# Patient Record
Sex: FEMALE | Race: WHITE | NOT HISPANIC OR LATINO | URBAN - METROPOLITAN AREA
[De-identification: names, ages, dates, MRNs, and addresses within clinical notes are randomized per-mention and may not be internally consistent; named-entity substitution may affect disease eponyms.]

---

## 2018-06-01 ENCOUNTER — OUTPATIENT (OUTPATIENT)
Dept: OUTPATIENT SERVICES | Facility: HOSPITAL | Age: 63
LOS: 1 days | End: 2018-06-01
Payer: MEDICAID

## 2018-06-01 PROCEDURE — G9001: CPT

## 2018-06-22 DIAGNOSIS — R69 ILLNESS, UNSPECIFIED: ICD-10-CM

## 2018-07-01 ENCOUNTER — OUTPATIENT (OUTPATIENT)
Dept: OUTPATIENT SERVICES | Facility: HOSPITAL | Age: 63
LOS: 1 days | End: 2018-07-01

## 2018-07-16 DIAGNOSIS — Z71.89 OTHER SPECIFIED COUNSELING: ICD-10-CM

## 2021-01-13 ENCOUNTER — RESULT REVIEW (OUTPATIENT)
Age: 66
End: 2021-01-13

## 2021-01-13 ENCOUNTER — APPOINTMENT (OUTPATIENT)
Dept: ULTRASOUND IMAGING | Facility: CLINIC | Age: 66
End: 2021-01-13
Payer: MEDICARE

## 2021-01-13 PROCEDURE — 93971 EXTREMITY STUDY: CPT | Mod: RT

## 2021-05-28 PROBLEM — Z00.00 ENCOUNTER FOR PREVENTIVE HEALTH EXAMINATION: Status: ACTIVE | Noted: 2021-05-28

## 2021-06-21 ENCOUNTER — APPOINTMENT (OUTPATIENT)
Dept: RADIOLOGY | Facility: CLINIC | Age: 66
End: 2021-06-21
Payer: MEDICARE

## 2021-06-21 PROCEDURE — 72110 X-RAY EXAM L-2 SPINE 4/>VWS: CPT

## 2022-03-01 ENCOUNTER — EMERGENCY (EMERGENCY)
Facility: HOSPITAL | Age: 67
LOS: 1 days | Discharge: HOME | End: 2022-03-01
Admitting: EMERGENCY MEDICINE
Payer: MEDICARE

## 2022-03-01 PROCEDURE — 93970 EXTREMITY STUDY: CPT | Mod: 26

## 2022-03-01 PROCEDURE — 99284 EMERGENCY DEPT VISIT MOD MDM: CPT

## 2022-03-02 ENCOUNTER — APPOINTMENT (OUTPATIENT)
Dept: CARDIOLOGY | Facility: CLINIC | Age: 67
End: 2022-03-02
Payer: MEDICARE

## 2022-03-02 ENCOUNTER — NON-APPOINTMENT (OUTPATIENT)
Age: 67
End: 2022-03-02

## 2022-03-02 VITALS — SYSTOLIC BLOOD PRESSURE: 126 MMHG | DIASTOLIC BLOOD PRESSURE: 76 MMHG

## 2022-03-02 VITALS
DIASTOLIC BLOOD PRESSURE: 76 MMHG | SYSTOLIC BLOOD PRESSURE: 120 MMHG | OXYGEN SATURATION: 97 % | BODY MASS INDEX: 28.71 KG/M2 | HEART RATE: 80 BPM | WEIGHT: 156 LBS | HEIGHT: 62 IN

## 2022-03-02 DIAGNOSIS — Z82.3 FAMILY HISTORY OF STROKE: ICD-10-CM

## 2022-03-02 DIAGNOSIS — Z82.79 FAMILY HISTORY OF OTHER CONGENITAL MALFORMATIONS, DEFORMATIONS AND CHROMOSOMAL ABNORMALITIES: ICD-10-CM

## 2022-03-02 DIAGNOSIS — Z78.9 OTHER SPECIFIED HEALTH STATUS: ICD-10-CM

## 2022-03-02 DIAGNOSIS — Z82.49 FAMILY HISTORY OF ISCHEMIC HEART DISEASE AND OTHER DISEASES OF THE CIRCULATORY SYSTEM: ICD-10-CM

## 2022-03-02 DIAGNOSIS — B20 HUMAN IMMUNODEFICIENCY VIRUS [HIV] DISEASE: ICD-10-CM

## 2022-03-02 DIAGNOSIS — Z87.891 PERSONAL HISTORY OF NICOTINE DEPENDENCE: ICD-10-CM

## 2022-03-02 DIAGNOSIS — F41.1 GENERALIZED ANXIETY DISORDER: ICD-10-CM

## 2022-03-02 PROCEDURE — 99204 OFFICE O/P NEW MOD 45 MIN: CPT | Mod: 25

## 2022-03-02 PROCEDURE — 93000 ELECTROCARDIOGRAM COMPLETE: CPT

## 2022-03-02 RX ORDER — AMLODIPINE BESYLATE 5 MG/1
5 TABLET ORAL DAILY
Refills: 0 | Status: DISCONTINUED | COMMUNITY
End: 2022-03-02

## 2022-03-02 RX ORDER — BUPROPION HYDROCHLORIDE 200 MG/1
200 TABLET, FILM COATED, EXTENDED RELEASE ORAL TWICE DAILY
Refills: 0 | Status: ACTIVE | COMMUNITY

## 2022-03-02 RX ORDER — FUROSEMIDE 20 MG/1
20 TABLET ORAL DAILY
Refills: 0 | Status: DISCONTINUED | COMMUNITY
End: 2022-03-02

## 2022-03-02 NOTE — HISTORY OF PRESENT ILLNESS
[FreeTextEntry1] : 66 year old female with history of HIV (undetectable viral load), HTN, HLD presents for a cardiac evaluation because of swelling in her legs that past two days. In the past, patient would have minimal swelling from time to time from salty foods. This was managed by Lasix 20mg PRN. The past two days her legs have become very swollen and the skin feels tight. The swelling is better when she wakes up in the morning and throughout the day the swelling worsens. Of note she was recently started on amlodipine 5mg daily (end of 2021).She went to [facility] ED, 3/1/2022, for evaluation. She underwent venous duplex, which revealed no evidence of DVT bilaterally.\par \par Patient has no chest pain, SOB, or palpitations. No orthopnea. \par \par There is no history of MI, CVA, CHF, or previous coronary intervention.\par

## 2022-03-02 NOTE — CARDIOLOGY SUMMARY
[de-identified] : 03/2/2022, NSR, normal ECG [de-identified] : 3/1/2022, Venous Duplex: no DVT bilaterally

## 2022-03-02 NOTE — PHYSICAL EXAM
[Normal] : normal gait [de-identified] : No carotid bruits auscultated bilaterally [de-identified] : pitting edema bilateral lower extremities extending half way up the shin and involving her feet as well.

## 2022-03-02 NOTE — DISCUSSION/SUMMARY
[FreeTextEntry1] : 1. Bilateral Lower Extremity Edema: appears to be dependent edema. No associated dyspnea or orthopnea. Venous Duplex, 3/1/2022, revealed no evidence of DVT. Recommend d/c amlodipine. Add HCTZ 25mg daily to antihypertensive medications, instead of Lasix PRN. Elevation and low salt diet recommended. Echocardiogram ordered. \par \par 2. HTN: controlled but amlodipine possibly contributing to lower extremity edema. Recommend starting HCTZ 25mg daily instead. Recommend continuing losartan 50mg daily. Goal BP less than 130/80. Low salt diet. \par \par 3. HLD: continue pravastatin 40mg daily.\par \par Follow up in 3 weeks for BP check and review echo results.

## 2022-03-03 DIAGNOSIS — R20.2 PARESTHESIA OF SKIN: ICD-10-CM

## 2022-03-03 DIAGNOSIS — M79.89 OTHER SPECIFIED SOFT TISSUE DISORDERS: ICD-10-CM

## 2022-03-03 DIAGNOSIS — I10 ESSENTIAL (PRIMARY) HYPERTENSION: ICD-10-CM

## 2022-03-03 DIAGNOSIS — Z21 ASYMPTOMATIC HUMAN IMMUNODEFICIENCY VIRUS [HIV] INFECTION STATUS: ICD-10-CM

## 2022-03-21 ENCOUNTER — APPOINTMENT (OUTPATIENT)
Dept: CARDIOLOGY | Facility: CLINIC | Age: 67
End: 2022-03-21
Payer: MEDICARE

## 2022-03-21 PROCEDURE — 93306 TTE W/DOPPLER COMPLETE: CPT

## 2022-03-26 ENCOUNTER — APPOINTMENT (OUTPATIENT)
Dept: CARDIOLOGY | Facility: CLINIC | Age: 67
End: 2022-03-26
Payer: MEDICARE

## 2022-03-26 VITALS
SYSTOLIC BLOOD PRESSURE: 154 MMHG | HEIGHT: 63 IN | BODY MASS INDEX: 26.22 KG/M2 | WEIGHT: 148 LBS | HEART RATE: 75 BPM | OXYGEN SATURATION: 99 % | DIASTOLIC BLOOD PRESSURE: 82 MMHG | TEMPERATURE: 97.1 F

## 2022-03-26 PROCEDURE — 99214 OFFICE O/P EST MOD 30 MIN: CPT

## 2022-03-26 NOTE — CARDIOLOGY SUMMARY
[de-identified] : 03/2/2022, NSR, normal ECG [de-identified] : 3/1/2022, Venous Duplex: no DVT bilaterally [de-identified] : 3/21/2022, LV EF 55-60%, mild MR, mild LVH, normal LV diastolic function, mild TR with estimated PASP of 29mmHg.

## 2022-03-26 NOTE — HISTORY OF PRESENT ILLNESS
[FreeTextEntry1] : Historical Perspective:\par 66 year old female with history of HIV (undetectable viral load), HTN, HLD presents for a cardiac evaluation because of swelling in her legs that past two days. In the past, patient would have minimal swelling from time to time from salty foods. This was managed by Lasix 20mg PRN. The past two days her legs have become very swollen and the skin feels tight. The swelling is better when she wakes up in the morning and throughout the day the swelling worsens. Of note she was recently started on amlodipine 5mg daily (end of 2021).She went to [facility] ED, 3/1/2022, for evaluation. She underwent venous duplex, which revealed no evidence of DVT bilaterally.\par \par Patient has no chest pain, SOB, or palpitations. No orthopnea. \par \par There is no history of MI, CVA, CHF, or previous coronary intervention.\par \par Current Health Status:\par Patient with no chest pain, SOB, or palpitations. No hospitalizations since seeing me last. Remains compliant with his medications and reports no adverse effects. Since discontinuation of amlodipine lower extremity swelling has improved. Patient states BPs running 120s/70-80 at home.\par \par

## 2022-03-26 NOTE — PHYSICAL EXAM
[Normal] : normal gait [de-identified] : No carotid bruits auscultated bilaterally [de-identified] : trace pitting edema bilateral lower extremities.

## 2022-06-27 ENCOUNTER — APPOINTMENT (OUTPATIENT)
Dept: MAMMOGRAPHY | Facility: CLINIC | Age: 67
End: 2022-06-27

## 2022-06-27 ENCOUNTER — APPOINTMENT (OUTPATIENT)
Dept: ULTRASOUND IMAGING | Facility: CLINIC | Age: 67
End: 2022-06-27

## 2022-06-27 PROCEDURE — 76775 US EXAM ABDO BACK WALL LIM: CPT

## 2022-06-27 PROCEDURE — 77063 BREAST TOMOSYNTHESIS BI: CPT

## 2022-06-27 PROCEDURE — 77067 SCR MAMMO BI INCL CAD: CPT

## 2022-09-22 ENCOUNTER — NON-APPOINTMENT (OUTPATIENT)
Age: 67
End: 2022-09-22

## 2022-09-22 ENCOUNTER — APPOINTMENT (OUTPATIENT)
Dept: CARDIOLOGY | Facility: CLINIC | Age: 67
End: 2022-09-22

## 2022-09-22 VITALS
DIASTOLIC BLOOD PRESSURE: 88 MMHG | OXYGEN SATURATION: 97 % | BODY MASS INDEX: 26.05 KG/M2 | WEIGHT: 147 LBS | SYSTOLIC BLOOD PRESSURE: 156 MMHG | HEIGHT: 63 IN | HEART RATE: 80 BPM

## 2022-09-22 PROCEDURE — 99214 OFFICE O/P EST MOD 30 MIN: CPT

## 2022-09-22 PROCEDURE — 93000 ELECTROCARDIOGRAM COMPLETE: CPT

## 2022-09-22 NOTE — DISCUSSION/SUMMARY
[FreeTextEntry1] : 1. Bilateral Lower Extremity Edema: appears to be dependent edema. No associated dyspnea or orthopnea. Venous Duplex, 3/1/2022, revealed no evidence of DVT. Recommended d/c amlodipine. Added HCTZ 25mg daily to antihypertensive medications, instead of Lasix PRN. Elevation and low salt diet recommended. Echocardiogram demonstrated normal LV EF and normal LV diastolic function. No significant valvular disease and normal estimated PASP.\par \par 2. HTN: controlled at home. Suspect reactive component to her HTN. Amlodipine possibly contributing to lower extremity edema. Recommended starting HCTZ 25mg daily instead. Recommend continuing losartan 50mg daily. Goal BP less than 130/80. Low salt diet. Patient states BPs running 120s/70-80 at home.\par \par 3. HLD: continue pravastatin 40mg daily.\par \par Follow up in 6 months.  [EKG obtained to assist in diagnosis and management of assessed problem(s)] : EKG obtained to assist in diagnosis and management of assessed problem(s)

## 2022-09-22 NOTE — CARDIOLOGY SUMMARY
[de-identified] : 9/22/2022, NSR with PAC [de-identified] : 3/21/2022, LV EF 55-60%, mild MR, mild LVH, normal LV diastolic function, mild TR with estimated PASP of 29mmHg. [de-identified] : 3/1/2022, Venous Duplex: no DVT bilaterally

## 2022-09-22 NOTE — PHYSICAL EXAM
[Normal] : normal gait [de-identified] : No carotid bruits auscultated bilaterally [de-identified] : trace pitting edema bilateral lower extremities.

## 2022-10-18 ENCOUNTER — APPOINTMENT (OUTPATIENT)
Dept: RADIOLOGY | Facility: CLINIC | Age: 67
End: 2022-10-18

## 2022-10-18 PROCEDURE — 77080 DXA BONE DENSITY AXIAL: CPT

## 2023-03-23 ENCOUNTER — NON-APPOINTMENT (OUTPATIENT)
Age: 68
End: 2023-03-23

## 2023-03-23 ENCOUNTER — APPOINTMENT (OUTPATIENT)
Dept: CARDIOLOGY | Facility: CLINIC | Age: 68
End: 2023-03-23
Payer: MEDICARE

## 2023-03-23 VITALS
HEIGHT: 63 IN | BODY MASS INDEX: 28.17 KG/M2 | DIASTOLIC BLOOD PRESSURE: 82 MMHG | OXYGEN SATURATION: 97 % | WEIGHT: 159 LBS | HEART RATE: 75 BPM | SYSTOLIC BLOOD PRESSURE: 172 MMHG

## 2023-03-23 PROCEDURE — 93000 ELECTROCARDIOGRAM COMPLETE: CPT

## 2023-03-23 PROCEDURE — 99214 OFFICE O/P EST MOD 30 MIN: CPT

## 2023-03-23 NOTE — PHYSICAL EXAM
[Normal] : normal gait [de-identified] : No carotid bruits auscultated bilaterally [de-identified] : trace pitting edema bilateral lower extremities.

## 2023-03-23 NOTE — CARDIOLOGY SUMMARY
[de-identified] : 3/23/2023, NSR, normal ECG [de-identified] : 3/21/2022, LV EF 55-60%, mild MR, mild LVH, normal LV diastolic function, mild TR with estimated PASP of 29mmHg. [de-identified] : 3/1/2022, Venous Duplex: no DVT bilaterally

## 2023-03-23 NOTE — DISCUSSION/SUMMARY
[FreeTextEntry1] : 1. Bilateral Lower Extremity Edema: appears to be dependent edema. No associated dyspnea or orthopnea. Venous Duplex, 3/1/2022, revealed no evidence of DVT. Recommended d/c amlodipine. Added HCTZ 25mg daily to antihypertensive medications, instead of Lasix PRN. Elevation and low salt diet recommended. Echocardiogram demonstrated normal LV EF and normal LV diastolic function. No significant valvular disease and normal estimated PASP.\par \par 2. HTN: controlled at home. Suspect reactive component to her HTN. Amlodipine possibly contributing to lower extremity edema. Recommended starting HCTZ 25mg daily instead. Recommend continuing losartan 50mg daily. Goal BP less than 130/80. Low salt diet. Patient states BPs running 120s/70-80 at home. She states she brought her automated cuff to her PCP office to test its accuracy and it was accurate.\par \par 3. HLD: continue pravastatin 40mg daily.\par \par Follow up in 6 months.  [EKG obtained to assist in diagnosis and management of assessed problem(s)] : EKG obtained to assist in diagnosis and management of assessed problem(s)

## 2023-03-23 NOTE — HISTORY OF PRESENT ILLNESS
[FreeTextEntry1] : Historical Perspective:\par 67 year old female with history of HIV (undetectable viral load), HTN, HLD presents for a cardiac evaluation because of swelling in her legs that past two days. In the past, patient would have minimal swelling from time to time from salty foods. This was managed by Lasix 20mg PRN. The past two days her legs have become very swollen and the skin feels tight. The swelling is better when she wakes up in the morning and throughout the day the swelling worsens. Of note she was recently started on amlodipine 5mg daily (end of 2021).She went to [facility] ED, 3/1/2022, for evaluation. She underwent venous duplex, which revealed no evidence of DVT bilaterally.\par \par Patient has no chest pain, SOB, or palpitations. No orthopnea. \par \par There is no history of MI, CVA, CHF, or previous coronary intervention.\par \par Current Health Status:\par Patient with no chest pain, SOB, or palpitations. No hospitalizations since seeing me last. Remains compliant with his medications and reports no adverse effects. Since discontinuation of amlodipine lower extremity swelling has improved. Patient states BPs running 120s/70-80 at home. She states she brought her automated cuff to her PCP office to test its accuracy and it was accurate.\par \par

## 2023-09-21 ENCOUNTER — APPOINTMENT (OUTPATIENT)
Dept: CARDIOLOGY | Facility: CLINIC | Age: 68
End: 2023-09-21
Payer: MEDICARE

## 2023-09-21 ENCOUNTER — NON-APPOINTMENT (OUTPATIENT)
Age: 68
End: 2023-09-21

## 2023-09-21 VITALS
SYSTOLIC BLOOD PRESSURE: 172 MMHG | BODY MASS INDEX: 25.16 KG/M2 | HEART RATE: 80 BPM | WEIGHT: 142 LBS | DIASTOLIC BLOOD PRESSURE: 90 MMHG | OXYGEN SATURATION: 98 % | HEIGHT: 63 IN

## 2023-09-21 PROCEDURE — 93000 ELECTROCARDIOGRAM COMPLETE: CPT

## 2023-09-21 PROCEDURE — 99214 OFFICE O/P EST MOD 30 MIN: CPT

## 2023-09-21 RX ORDER — LOSARTAN POTASSIUM 50 MG/1
50 TABLET, FILM COATED ORAL DAILY
Qty: 90 | Refills: 3 | Status: DISCONTINUED | COMMUNITY
Start: 1900-01-01 | End: 2023-09-21

## 2023-09-21 RX ORDER — HYDROCHLOROTHIAZIDE 25 MG/1
25 TABLET ORAL DAILY
Qty: 90 | Refills: 3 | Status: DISCONTINUED | COMMUNITY
Start: 2022-03-02 | End: 2023-09-21

## 2024-03-21 ENCOUNTER — APPOINTMENT (OUTPATIENT)
Dept: CARDIOLOGY | Facility: CLINIC | Age: 69
End: 2024-03-21
Payer: MEDICARE

## 2024-03-21 ENCOUNTER — NON-APPOINTMENT (OUTPATIENT)
Age: 69
End: 2024-03-21

## 2024-03-21 VITALS
HEIGHT: 63 IN | OXYGEN SATURATION: 98 % | WEIGHT: 149 LBS | BODY MASS INDEX: 26.4 KG/M2 | SYSTOLIC BLOOD PRESSURE: 142 MMHG | DIASTOLIC BLOOD PRESSURE: 86 MMHG | HEART RATE: 79 BPM

## 2024-03-21 DIAGNOSIS — E78.5 HYPERLIPIDEMIA, UNSPECIFIED: ICD-10-CM

## 2024-03-21 DIAGNOSIS — I10 ESSENTIAL (PRIMARY) HYPERTENSION: ICD-10-CM

## 2024-03-21 DIAGNOSIS — R60.0 LOCALIZED EDEMA: ICD-10-CM

## 2024-03-21 PROCEDURE — 99214 OFFICE O/P EST MOD 30 MIN: CPT

## 2024-03-21 PROCEDURE — 93000 ELECTROCARDIOGRAM COMPLETE: CPT

## 2024-03-21 PROCEDURE — G2211 COMPLEX E/M VISIT ADD ON: CPT

## 2024-03-21 RX ORDER — EZETIMIBE 10 MG/1
10 TABLET ORAL
Qty: 90 | Refills: 3 | Status: ACTIVE | COMMUNITY
Start: 1900-01-01 | End: 1900-01-01

## 2024-03-21 RX ORDER — RITONAVIR 100 MG 100 MG/1
100 TABLET ORAL DAILY
Refills: 0 | Status: DISCONTINUED | COMMUNITY
End: 2024-03-21

## 2024-03-21 RX ORDER — PRAVASTATIN SODIUM 40 MG/1
40 TABLET ORAL
Qty: 1 | Refills: 3 | Status: ACTIVE | COMMUNITY
Start: 1900-01-01 | End: 1900-01-01

## 2024-03-21 RX ORDER — RALTEGRAVIR 400 MG/1
400 TABLET, FILM COATED ORAL TWICE DAILY
Refills: 0 | Status: DISCONTINUED | COMMUNITY
End: 2024-03-21

## 2024-03-21 RX ORDER — LOSARTAN POTASSIUM AND HYDROCHLOROTHIAZIDE 25; 100 MG/1; MG/1
100-25 TABLET ORAL DAILY
Qty: 90 | Refills: 3 | Status: ACTIVE | COMMUNITY
Start: 2023-09-21 | End: 1900-01-01

## 2024-03-21 RX ORDER — BICTEGRAVIR SODIUM, EMTRICITABINE, AND TENOFOVIR ALAFENAMIDE FUMARATE 30; 120; 15 MG/1; MG/1; MG/1
TABLET ORAL
Refills: 0 | Status: ACTIVE | COMMUNITY

## 2024-03-21 RX ORDER — LAMIVUDINE 150 MG/1
150 TABLET, FILM COATED ORAL DAILY
Refills: 0 | Status: DISCONTINUED | COMMUNITY
End: 2024-03-21

## 2024-03-21 RX ORDER — DARUNAVIR 800 MG/1
800 TABLET, FILM COATED ORAL DAILY
Refills: 0 | Status: DISCONTINUED | COMMUNITY
End: 2024-03-21

## 2024-03-21 NOTE — HISTORY OF PRESENT ILLNESS
[FreeTextEntry1] : Historical Perspective: 68 year old female with history of HIV (undetectable viral load), HTN, HLD presents for a cardiac evaluation because of swelling in her legs that past two days. In the past, patient would have minimal swelling from time to time from salty foods. This was managed by Lasix 20mg PRN. The past two days her legs have become very swollen and the skin feels tight. The swelling is better when she wakes up in the morning and throughout the day the swelling worsens. Of note she was recently started on amlodipine 5mg daily (end of 2021).She went to [facility] ED, 3/1/2022, for evaluation. She underwent venous duplex, which revealed no evidence of DVT bilaterally.  Patient has no chest pain, SOB, or palpitations. No orthopnea.   There is no history of MI, CVA, CHF, or previous coronary intervention.  Current Health Status: Patient with no chest pain, SOB, or palpitations. No hospitalizations since seeing me last. Remains compliant with his medications and reports no adverse effects. Since discontinuation of amlodipine lower extremity swelling has improved. Patient states SBPs running 150s at home. She states she brought her automated cuff to her PCP office to test its accuracy and it was accurate.

## 2024-03-21 NOTE — CARDIOLOGY SUMMARY
[de-identified] : 3/21/2024, NSR, normal ECG 9/21/2023, NSR, normal ECG 3/23/2023, NSR, normal ECG [de-identified] : 3/1/2022, Venous Duplex: no DVT bilaterally [de-identified] : 3/21/2022, LV EF 55-60%, mild MR, mild LVH, normal LV diastolic function, mild TR with estimated PASP of 29mmHg.

## 2024-03-21 NOTE — DISCUSSION/SUMMARY
[FreeTextEntry1] : 1. Bilateral Lower Extremity Edema: appears to be dependent edema. No associated dyspnea or orthopnea. Venous Duplex, 3/1/2022, revealed no evidence of DVT. Recommended d/c amlodipine. Added HCTZ 25mg daily to antihypertensive medications, instead of Lasix PRN. Elevation and low salt diet recommended. Echocardiogram demonstrated normal LV EF and normal LV diastolic function. No significant valvular disease and normal estimated PASP.  2. HTN:  Amlodipine in the past was contributing to lower extremity edema. Recommended losartan/HCTZ 100/25mg daily instead. Goal BP less than 130/80. Low salt diet.   3. HLD: continue pravastatin 40mg daily and Zetia 10mg daily.  Follow up in 6 months.  [EKG obtained to assist in diagnosis and management of assessed problem(s)] : EKG obtained to assist in diagnosis and management of assessed problem(s)

## 2024-09-19 ENCOUNTER — APPOINTMENT (OUTPATIENT)
Dept: CARDIOLOGY | Facility: CLINIC | Age: 69
End: 2024-09-19
Payer: MEDICARE

## 2024-09-19 ENCOUNTER — NON-APPOINTMENT (OUTPATIENT)
Age: 69
End: 2024-09-19

## 2024-09-19 VITALS
SYSTOLIC BLOOD PRESSURE: 142 MMHG | HEIGHT: 61 IN | OXYGEN SATURATION: 97 % | HEART RATE: 80 BPM | WEIGHT: 158 LBS | DIASTOLIC BLOOD PRESSURE: 80 MMHG | BODY MASS INDEX: 29.83 KG/M2

## 2024-09-19 DIAGNOSIS — I10 ESSENTIAL (PRIMARY) HYPERTENSION: ICD-10-CM

## 2024-09-19 DIAGNOSIS — E78.5 HYPERLIPIDEMIA, UNSPECIFIED: ICD-10-CM

## 2024-09-19 DIAGNOSIS — R60.0 LOCALIZED EDEMA: ICD-10-CM

## 2024-09-19 PROCEDURE — 93000 ELECTROCARDIOGRAM COMPLETE: CPT

## 2024-09-19 PROCEDURE — 99214 OFFICE O/P EST MOD 30 MIN: CPT

## 2024-09-19 PROCEDURE — G2211 COMPLEX E/M VISIT ADD ON: CPT

## 2024-09-19 NOTE — CARDIOLOGY SUMMARY
[de-identified] : 9/19/2024, NSR, normal ECG 3/21/2024, NSR, normal ECG 9/21/2023, NSR, normal ECG 3/23/2023, NSR, normal ECG [de-identified] : 3/21/2022, LV EF 55-60%, mild MR, mild LVH, normal LV diastolic function, mild TR with estimated PASP of 29mmHg. [de-identified] : 3/1/2022, Venous Duplex: no DVT bilaterally

## 2024-09-19 NOTE — DISCUSSION/SUMMARY
[FreeTextEntry1] : 1. Bilateral Lower Extremity Edema: appears to be dependent edema. No associated dyspnea or orthopnea. Venous Duplex, 3/1/2022, revealed no evidence of DVT. Recommended d/c amlodipine. Added HCTZ 25mg daily to antihypertensive medications, instead of Lasix PRN. Elevation and low salt diet recommended. Echocardiogram demonstrated normal LV EF and normal LV diastolic function. No significant valvular disease and normal estimated PASP.  2. HTN:  Amlodipine in the past was contributing to lower extremity edema. Recommended continuing losartan/HCTZ 100/25mg daily instead. Goal BP less than 130/80. Low salt diet.   3. HLD: continue pravastatin 40mg daily and Zetia 10mg daily.  Follow up in 6 months.  [EKG obtained to assist in diagnosis and management of assessed problem(s)] : EKG obtained to assist in diagnosis and management of assessed problem(s)

## 2024-09-19 NOTE — HISTORY OF PRESENT ILLNESS
[FreeTextEntry1] : Historical Perspective: 69 year old female with history of HIV (undetectable viral load), HTN, HLD presents for a cardiac evaluation because of swelling in her legs that past two days. In the past, patient would have minimal swelling from time to time from salty foods. This was managed by Lasix 20mg PRN. The past two days her legs have become very swollen and the skin feels tight. The swelling is better when she wakes up in the morning and throughout the day the swelling worsens. Of note she was recently started on amlodipine 5mg daily (end of 2021).She went to [facility] ED, 3/1/2022, for evaluation. She underwent venous duplex, which revealed no evidence of DVT bilaterally.  Patient has no chest pain, SOB, or palpitations. No orthopnea.   There is no history of MI, CVA, CHF, or previous coronary intervention.  Current Health Status: Patient with no chest pain, SOB, or palpitations. No hospitalizations since seeing me last. Remains compliant with his medications and reports no adverse effects. Since discontinuation of amlodipine lower extremity swelling has improved.

## 2024-09-19 NOTE — PHYSICAL EXAM
[Normal] : normal gait [de-identified] : No carotid bruits auscultated bilaterally [de-identified] : trace pitting edema bilateral lower extremities.

## 2024-09-27 ENCOUNTER — APPOINTMENT (OUTPATIENT)
Dept: RADIOLOGY | Facility: CLINIC | Age: 69
End: 2024-09-27
Payer: MEDICARE

## 2024-09-27 PROCEDURE — 73030 X-RAY EXAM OF SHOULDER: CPT | Mod: RT

## 2024-09-27 PROCEDURE — 73130 X-RAY EXAM OF HAND: CPT | Mod: LT

## 2025-03-04 ENCOUNTER — APPOINTMENT (OUTPATIENT)
Dept: CARDIOLOGY | Facility: CLINIC | Age: 70
End: 2025-03-04

## 2025-04-17 ENCOUNTER — NON-APPOINTMENT (OUTPATIENT)
Age: 70
End: 2025-04-17

## 2025-04-17 ENCOUNTER — APPOINTMENT (OUTPATIENT)
Dept: CARDIOLOGY | Facility: CLINIC | Age: 70
End: 2025-04-17
Payer: MEDICARE

## 2025-04-17 VITALS
HEART RATE: 82 BPM | WEIGHT: 162 LBS | BODY MASS INDEX: 30.61 KG/M2 | SYSTOLIC BLOOD PRESSURE: 124 MMHG | OXYGEN SATURATION: 98 % | DIASTOLIC BLOOD PRESSURE: 88 MMHG

## 2025-04-17 DIAGNOSIS — I10 ESSENTIAL (PRIMARY) HYPERTENSION: ICD-10-CM

## 2025-04-17 DIAGNOSIS — R60.0 LOCALIZED EDEMA: ICD-10-CM

## 2025-04-17 DIAGNOSIS — E78.5 HYPERLIPIDEMIA, UNSPECIFIED: ICD-10-CM

## 2025-04-17 PROCEDURE — G2211 COMPLEX E/M VISIT ADD ON: CPT

## 2025-04-17 PROCEDURE — 93000 ELECTROCARDIOGRAM COMPLETE: CPT

## 2025-04-17 PROCEDURE — 99214 OFFICE O/P EST MOD 30 MIN: CPT
